# Patient Record
Sex: MALE | Race: WHITE | HISPANIC OR LATINO | URBAN - METROPOLITAN AREA
[De-identification: names, ages, dates, MRNs, and addresses within clinical notes are randomized per-mention and may not be internally consistent; named-entity substitution may affect disease eponyms.]

---

## 2023-01-04 ENCOUNTER — OUTPATIENT (OUTPATIENT)
Dept: OUTPATIENT SERVICES | Facility: HOSPITAL | Age: 23
LOS: 1 days | End: 2023-01-04
Payer: MEDICAID

## 2023-01-04 VITALS
OXYGEN SATURATION: 99 % | RESPIRATION RATE: 18 BRPM | DIASTOLIC BLOOD PRESSURE: 82 MMHG | HEIGHT: 71 IN | TEMPERATURE: 98 F | HEART RATE: 97 BPM | WEIGHT: 250 LBS | SYSTOLIC BLOOD PRESSURE: 130 MMHG

## 2023-01-04 DIAGNOSIS — N13.39 OTHER HYDRONEPHROSIS: ICD-10-CM

## 2023-01-04 DIAGNOSIS — Z01.818 ENCOUNTER FOR OTHER PREPROCEDURAL EXAMINATION: ICD-10-CM

## 2023-01-04 PROCEDURE — G0463: CPT

## 2023-01-04 NOTE — H&P PST ADULT - HISTORY OF PRESENT ILLNESS
22year old male with no significant pmhx presents with c/o hematuria related kidney calculus/ureteral obstruction. Patient is here today for presurgical testing for scheduled robotic assisted pyeloplasty with uteropelvic junction repair on 1/12/2023

## 2023-01-04 NOTE — H&P PST ADULT - PROBLEM SELECTOR PLAN 1
Patient is scheduled for robotic assisted pyeloplasty with uteropelvic junction repair on 1/12/2023  Written and oral preoperative instructions given to patient with understanding verbalized.   Instructions given to include using 4% chlorhexidine wash as directed starting 3days before day of surgery (inclusive of day of surgery)  Maintaining NPO status post midnight day before surgery  Stopping aspirin, NSAIDs, herbs, vitamins 7days before surgery   Patient is to expect a phone call day before surgery between the hours of 430- 630pm giving arrival time for surgery day.    Reinforced compliance with mandatory preop covid test.  Agrees to comply   Patient today with STOP bang score of 2, Low risk for MUMTAZ   Preoperative labs done at Dr. Shine's office, results to be obtained

## 2023-01-11 ENCOUNTER — TRANSCRIPTION ENCOUNTER (OUTPATIENT)
Age: 23
End: 2023-01-11

## 2023-01-11 RX ORDER — SODIUM CHLORIDE 9 MG/ML
3 INJECTION INTRAMUSCULAR; INTRAVENOUS; SUBCUTANEOUS EVERY 8 HOURS
Refills: 0 | Status: DISCONTINUED | OUTPATIENT
Start: 2023-01-12 | End: 2023-01-15

## 2023-01-12 ENCOUNTER — INPATIENT (INPATIENT)
Facility: HOSPITAL | Age: 23
LOS: 2 days | Discharge: HOME | DRG: 660 | End: 2023-01-15
Attending: UROLOGY | Admitting: UROLOGY
Payer: MEDICAID

## 2023-01-12 VITALS
TEMPERATURE: 99 F | RESPIRATION RATE: 18 BRPM | DIASTOLIC BLOOD PRESSURE: 75 MMHG | WEIGHT: 250 LBS | HEIGHT: 71 IN | OXYGEN SATURATION: 100 % | HEART RATE: 87 BPM | SYSTOLIC BLOOD PRESSURE: 132 MMHG

## 2023-01-12 DIAGNOSIS — N13.39 OTHER HYDRONEPHROSIS: ICD-10-CM

## 2023-01-12 DIAGNOSIS — Z01.818 ENCOUNTER FOR OTHER PREPROCEDURAL EXAMINATION: ICD-10-CM

## 2023-01-12 PROBLEM — N20.0 CALCULUS OF KIDNEY: Chronic | Status: ACTIVE | Noted: 2023-01-04

## 2023-01-12 PROBLEM — Z87.448 PERSONAL HISTORY OF OTHER DISEASES OF URINARY SYSTEM: Chronic | Status: ACTIVE | Noted: 2023-01-04

## 2023-01-12 PROBLEM — E66.9 OBESITY, UNSPECIFIED: Chronic | Status: ACTIVE | Noted: 2023-01-04

## 2023-01-12 LAB
ABO RH CONFIRMATION: SIGNIFICANT CHANGE UP
BLD GP AB SCN SERPL QL: SIGNIFICANT CHANGE UP

## 2023-01-12 DEVICE — URETERAL STENT PERCUFLEX PLUS 6FR 26CM: Type: IMPLANTABLE DEVICE | Status: FUNCTIONAL

## 2023-01-12 DEVICE — URETERAL CATH OPEN END FLEXI-TIP 5FR .038" X 70CM: Type: IMPLANTABLE DEVICE | Status: FUNCTIONAL

## 2023-01-12 DEVICE — STONE BASKET ESCAPE NITINOL 4-WIRE 1.9FR X 120CM: Type: IMPLANTABLE DEVICE | Status: FUNCTIONAL

## 2023-01-12 DEVICE — GUIDEWIRE SENSOR DUAL-FLEX NITINOL STRAIGHT .038" X 150CM: Type: IMPLANTABLE DEVICE | Status: FUNCTIONAL

## 2023-01-12 RX ORDER — FENTANYL CITRATE 50 UG/ML
25 INJECTION INTRAVENOUS
Refills: 0 | Status: DISCONTINUED | OUTPATIENT
Start: 2023-01-12 | End: 2023-01-13

## 2023-01-12 RX ORDER — SENNA PLUS 8.6 MG/1
2 TABLET ORAL AT BEDTIME
Refills: 0 | Status: DISCONTINUED | OUTPATIENT
Start: 2023-01-12 | End: 2023-01-15

## 2023-01-12 RX ORDER — OXYCODONE HYDROCHLORIDE 5 MG/1
5 TABLET ORAL EVERY 6 HOURS
Refills: 0 | Status: DISCONTINUED | OUTPATIENT
Start: 2023-01-12 | End: 2023-01-13

## 2023-01-12 RX ORDER — HEPARIN SODIUM 5000 [USP'U]/ML
5000 INJECTION INTRAVENOUS; SUBCUTANEOUS EVERY 8 HOURS
Refills: 0 | Status: DISCONTINUED | OUTPATIENT
Start: 2023-01-12 | End: 2023-01-13

## 2023-01-12 RX ORDER — CEFAZOLIN SODIUM 1 G
1000 VIAL (EA) INJECTION EVERY 6 HOURS
Refills: 0 | Status: COMPLETED | OUTPATIENT
Start: 2023-01-12 | End: 2023-01-13

## 2023-01-12 RX ORDER — FENTANYL CITRATE 50 UG/ML
50 INJECTION INTRAVENOUS
Refills: 0 | Status: DISCONTINUED | OUTPATIENT
Start: 2023-01-12 | End: 2023-01-13

## 2023-01-12 RX ORDER — SODIUM CHLORIDE 9 MG/ML
1000 INJECTION, SOLUTION INTRAVENOUS
Refills: 0 | Status: DISCONTINUED | OUTPATIENT
Start: 2023-01-12 | End: 2023-01-13

## 2023-01-12 RX ORDER — ACETAMINOPHEN 500 MG
975 TABLET ORAL EVERY 6 HOURS
Refills: 0 | Status: DISCONTINUED | OUTPATIENT
Start: 2023-01-12 | End: 2023-01-13

## 2023-01-12 RX ORDER — MELOXICAM 15 MG/1
1 TABLET ORAL
Qty: 0 | Refills: 0 | DISCHARGE

## 2023-01-12 RX ORDER — ACETAMINOPHEN 500 MG
1000 TABLET ORAL ONCE
Refills: 0 | Status: COMPLETED | OUTPATIENT
Start: 2023-01-12 | End: 2023-01-13

## 2023-01-12 RX ORDER — OXYCODONE HYDROCHLORIDE 5 MG/1
10 TABLET ORAL EVERY 6 HOURS
Refills: 0 | Status: DISCONTINUED | OUTPATIENT
Start: 2023-01-12 | End: 2023-01-13

## 2023-01-12 RX ORDER — ONDANSETRON 8 MG/1
4 TABLET, FILM COATED ORAL ONCE
Refills: 0 | Status: DISCONTINUED | OUTPATIENT
Start: 2023-01-12 | End: 2023-01-13

## 2023-01-12 RX ADMIN — HEPARIN SODIUM 5000 UNIT(S): 5000 INJECTION INTRAVENOUS; SUBCUTANEOUS at 22:46

## 2023-01-12 RX ADMIN — SODIUM CHLORIDE 3 MILLILITER(S): 9 INJECTION INTRAMUSCULAR; INTRAVENOUS; SUBCUTANEOUS at 22:47

## 2023-01-12 RX ADMIN — SODIUM CHLORIDE 75 MILLILITER(S): 9 INJECTION, SOLUTION INTRAVENOUS at 22:47

## 2023-01-12 NOTE — BRIEF OPERATIVE NOTE - OPERATION/FINDINGS
Left ureteral stent placement followed by left robotic assisted pyeloplasty. 16Fr moore and CHELA drain left in place. EBL minimal.

## 2023-01-13 ENCOUNTER — TRANSCRIPTION ENCOUNTER (OUTPATIENT)
Age: 23
End: 2023-01-13

## 2023-01-13 LAB
ANION GAP SERPL CALC-SCNC: 10 MMOL/L — SIGNIFICANT CHANGE UP (ref 5–17)
ANION GAP SERPL CALC-SCNC: 11 MMOL/L — SIGNIFICANT CHANGE UP (ref 5–17)
BASOPHILS # BLD AUTO: 0.02 K/UL — SIGNIFICANT CHANGE UP (ref 0–0.2)
BASOPHILS NFR BLD AUTO: 0.1 % — SIGNIFICANT CHANGE UP (ref 0–2)
BUN SERPL-MCNC: 19 MG/DL — HIGH (ref 7–18)
BUN SERPL-MCNC: 22 MG/DL — HIGH (ref 7–18)
CALCIUM SERPL-MCNC: 8.6 MG/DL — SIGNIFICANT CHANGE UP (ref 8.4–10.5)
CALCIUM SERPL-MCNC: 8.7 MG/DL — SIGNIFICANT CHANGE UP (ref 8.4–10.5)
CHLORIDE SERPL-SCNC: 102 MMOL/L — SIGNIFICANT CHANGE UP (ref 96–108)
CHLORIDE SERPL-SCNC: 102 MMOL/L — SIGNIFICANT CHANGE UP (ref 96–108)
CO2 SERPL-SCNC: 22 MMOL/L — SIGNIFICANT CHANGE UP (ref 22–31)
CO2 SERPL-SCNC: 24 MMOL/L — SIGNIFICANT CHANGE UP (ref 22–31)
CREAT SERPL-MCNC: 1.1 MG/DL — SIGNIFICANT CHANGE UP (ref 0.5–1.3)
CREAT SERPL-MCNC: 1.23 MG/DL — SIGNIFICANT CHANGE UP (ref 0.5–1.3)
EGFR: 86 ML/MIN/1.73M2 — SIGNIFICANT CHANGE UP
EGFR: 98 ML/MIN/1.73M2 — SIGNIFICANT CHANGE UP
EOSINOPHIL # BLD AUTO: 0 K/UL — SIGNIFICANT CHANGE UP (ref 0–0.5)
EOSINOPHIL NFR BLD AUTO: 0 % — SIGNIFICANT CHANGE UP (ref 0–6)
GLUCOSE SERPL-MCNC: 138 MG/DL — HIGH (ref 70–99)
GLUCOSE SERPL-MCNC: 155 MG/DL — HIGH (ref 70–99)
HCT VFR BLD CALC: 30.9 % — LOW (ref 39–50)
HCT VFR BLD CALC: 33.9 % — LOW (ref 39–50)
HGB BLD-MCNC: 10.5 G/DL — LOW (ref 13–17)
HGB BLD-MCNC: 11.3 G/DL — LOW (ref 13–17)
IMM GRANULOCYTES NFR BLD AUTO: 0.7 % — SIGNIFICANT CHANGE UP (ref 0–0.9)
LYMPHOCYTES # BLD AUTO: 1.97 K/UL — SIGNIFICANT CHANGE UP (ref 1–3.3)
LYMPHOCYTES # BLD AUTO: 11.5 % — LOW (ref 13–44)
MCHC RBC-ENTMCNC: 27.4 PG — SIGNIFICANT CHANGE UP (ref 27–34)
MCHC RBC-ENTMCNC: 28 PG — SIGNIFICANT CHANGE UP (ref 27–34)
MCHC RBC-ENTMCNC: 33.3 GM/DL — SIGNIFICANT CHANGE UP (ref 32–36)
MCHC RBC-ENTMCNC: 34 GM/DL — SIGNIFICANT CHANGE UP (ref 32–36)
MCV RBC AUTO: 82.1 FL — SIGNIFICANT CHANGE UP (ref 80–100)
MCV RBC AUTO: 82.4 FL — SIGNIFICANT CHANGE UP (ref 80–100)
MONOCYTES # BLD AUTO: 1.25 K/UL — HIGH (ref 0–0.9)
MONOCYTES NFR BLD AUTO: 7.3 % — SIGNIFICANT CHANGE UP (ref 2–14)
NEUTROPHILS # BLD AUTO: 13.7 K/UL — HIGH (ref 1.8–7.4)
NEUTROPHILS NFR BLD AUTO: 80.4 % — HIGH (ref 43–77)
NRBC # BLD: 0 /100 WBCS — SIGNIFICANT CHANGE UP (ref 0–0)
NRBC # BLD: 0 /100 WBCS — SIGNIFICANT CHANGE UP (ref 0–0)
PLATELET # BLD AUTO: 230 K/UL — SIGNIFICANT CHANGE UP (ref 150–400)
PLATELET # BLD AUTO: 270 K/UL — SIGNIFICANT CHANGE UP (ref 150–400)
POTASSIUM SERPL-MCNC: 4.2 MMOL/L — SIGNIFICANT CHANGE UP (ref 3.5–5.3)
POTASSIUM SERPL-MCNC: 4.7 MMOL/L — SIGNIFICANT CHANGE UP (ref 3.5–5.3)
POTASSIUM SERPL-SCNC: 4.2 MMOL/L — SIGNIFICANT CHANGE UP (ref 3.5–5.3)
POTASSIUM SERPL-SCNC: 4.7 MMOL/L — SIGNIFICANT CHANGE UP (ref 3.5–5.3)
RBC # BLD: 3.75 M/UL — LOW (ref 4.2–5.8)
RBC # BLD: 4.13 M/UL — LOW (ref 4.2–5.8)
RBC # FLD: 13.8 % — SIGNIFICANT CHANGE UP (ref 10.3–14.5)
RBC # FLD: 13.9 % — SIGNIFICANT CHANGE UP (ref 10.3–14.5)
SARS-COV-2 RNA SPEC QL NAA+PROBE: SIGNIFICANT CHANGE UP
SODIUM SERPL-SCNC: 135 MMOL/L — SIGNIFICANT CHANGE UP (ref 135–145)
SODIUM SERPL-SCNC: 136 MMOL/L — SIGNIFICANT CHANGE UP (ref 135–145)
WBC # BLD: 16.64 K/UL — HIGH (ref 3.8–10.5)
WBC # BLD: 17.06 K/UL — HIGH (ref 3.8–10.5)
WBC # FLD AUTO: 16.64 K/UL — HIGH (ref 3.8–10.5)
WBC # FLD AUTO: 17.06 K/UL — HIGH (ref 3.8–10.5)

## 2023-01-13 RX ORDER — DOCUSATE SODIUM 100 MG
1 CAPSULE ORAL
Qty: 14 | Refills: 0
Start: 2023-01-13 | End: 2023-01-19

## 2023-01-13 RX ORDER — INFLUENZA VIRUS VACCINE 15; 15; 15; 15 UG/.5ML; UG/.5ML; UG/.5ML; UG/.5ML
0.5 SUSPENSION INTRAMUSCULAR ONCE
Refills: 0 | Status: DISCONTINUED | OUTPATIENT
Start: 2023-01-13 | End: 2023-01-15

## 2023-01-13 RX ORDER — ACETAMINOPHEN 500 MG
1000 TABLET ORAL EVERY 6 HOURS
Refills: 0 | Status: COMPLETED | OUTPATIENT
Start: 2023-01-13 | End: 2023-01-14

## 2023-01-13 RX ORDER — SODIUM CHLORIDE 9 MG/ML
1000 INJECTION, SOLUTION INTRAVENOUS
Refills: 0 | Status: DISCONTINUED | OUTPATIENT
Start: 2023-01-13 | End: 2023-01-14

## 2023-01-13 RX ORDER — OXYCODONE HYDROCHLORIDE 5 MG/1
1 TABLET ORAL
Qty: 16 | Refills: 0
Start: 2023-01-13 | End: 2023-01-16

## 2023-01-13 RX ADMIN — Medication 1000 MILLIGRAM(S): at 20:51

## 2023-01-13 RX ADMIN — HEPARIN SODIUM 5000 UNIT(S): 5000 INJECTION INTRAVENOUS; SUBCUTANEOUS at 06:04

## 2023-01-13 RX ADMIN — Medication 975 MILLIGRAM(S): at 06:03

## 2023-01-13 RX ADMIN — Medication 1000 MILLIGRAM(S): at 01:24

## 2023-01-13 RX ADMIN — Medication 100 MILLIGRAM(S): at 06:04

## 2023-01-13 RX ADMIN — Medication 975 MILLIGRAM(S): at 13:15

## 2023-01-13 RX ADMIN — OXYCODONE HYDROCHLORIDE 5 MILLIGRAM(S): 5 TABLET ORAL at 06:03

## 2023-01-13 RX ADMIN — SENNA PLUS 2 TABLET(S): 8.6 TABLET ORAL at 21:59

## 2023-01-13 RX ADMIN — SODIUM CHLORIDE 3 MILLILITER(S): 9 INJECTION INTRAMUSCULAR; INTRAVENOUS; SUBCUTANEOUS at 06:15

## 2023-01-13 RX ADMIN — Medication 975 MILLIGRAM(S): at 12:39

## 2023-01-13 RX ADMIN — OXYCODONE HYDROCHLORIDE 10 MILLIGRAM(S): 5 TABLET ORAL at 02:09

## 2023-01-13 RX ADMIN — SODIUM CHLORIDE 125 MILLILITER(S): 9 INJECTION, SOLUTION INTRAVENOUS at 14:05

## 2023-01-13 RX ADMIN — SODIUM CHLORIDE 75 MILLILITER(S): 9 INJECTION, SOLUTION INTRAVENOUS at 12:37

## 2023-01-13 RX ADMIN — Medication 400 MILLIGRAM(S): at 00:54

## 2023-01-13 RX ADMIN — Medication 100 MILLIGRAM(S): at 00:54

## 2023-01-13 RX ADMIN — SODIUM CHLORIDE 3 MILLILITER(S): 9 INJECTION INTRAMUSCULAR; INTRAVENOUS; SUBCUTANEOUS at 14:03

## 2023-01-13 RX ADMIN — Medication 400 MILLIGRAM(S): at 20:25

## 2023-01-13 RX ADMIN — SODIUM CHLORIDE 3 MILLILITER(S): 9 INJECTION INTRAMUSCULAR; INTRAVENOUS; SUBCUTANEOUS at 21:11

## 2023-01-13 NOTE — CHART NOTE - NSCHARTNOTEFT_GEN_A_CORE
23 y/o Male s/p left pyeloplasty 1/12/23    Called by nursing staff, increased CHELA drain output, draining 400cc for the past 300cc.   Pt has not voided since Kelly catheter removed this AM.     Pt seen and examined at bedside. Admits to incisional pain, denies dizziness, lightheadedness. No SOB.      Vital Signs Last 24 Hrs  T(C): 36.8 (13 Jan 2023 12:44), Max: 36.8 (12 Jan 2023 21:10)  T(F): 98.3 (13 Jan 2023 12:44), Max: 98.3 (13 Jan 2023 12:44)  HR: 106 (13 Jan 2023 12:44) (102 - 118)  BP: 133/53 (13 Jan 2023 12:44) (103/48 - 152/78)  BP(mean): 91 (12 Jan 2023 21:25) (87 - 98)  RR: 18 (13 Jan 2023 12:44) (11 - 23)  SpO2: 98% (13 Jan 2023 12:44) (93% - 100%)      Physical Exam  General: AAOx3, No acute distress  Abdomen: soft, nondistended, min incisional TTP, dressing c/d/i, no rebound tenderness, no guarding, no palpable masses, CHELA in place output sanguinous   : Normal external genitalia  Extremities: non edematous, no calf pain bilaterally        Labs:                        10.5   16.64 )-----------( 230      ( 13 Jan 2023 13:20 )             30.9     01-13    136  |  102  |  22<H>  ----------------------------<  155<H>  4.2   |  24  |  x     Ca    8.7      13 Jan 2023 13:20        A/P:    23 y/o Male s/p left pyeloplasty 1/12/23  Urinary retention, increased CHELA drain output    -STAT cbc   -Kelly catheter   -NPO  -IVF at 125cc/hr   -Bedrest  -Discontinue anticoagulation  -D/w Dr. Shine and aware 21 y/o Male s/p left pyeloplasty 1/12/23    Called by nursing staff, increased CHELA drain output, draining 400cc for the past 300cc.   Pt has not voided since Kelly catheter removed this AM.     Pt seen and examined at bedside. Admits to incisional pain, denies dizziness, lightheadedness. No SOB.      Vital Signs Last 24 Hrs  T(C): 36.8 (13 Jan 2023 12:44), Max: 36.8 (12 Jan 2023 21:10)  T(F): 98.3 (13 Jan 2023 12:44), Max: 98.3 (13 Jan 2023 12:44)  HR: 106 (13 Jan 2023 12:44) (102 - 118)  BP: 133/53 (13 Jan 2023 12:44) (103/48 - 152/78)  BP(mean): 91 (12 Jan 2023 21:25) (87 - 98)  RR: 18 (13 Jan 2023 12:44) (11 - 23)  SpO2: 98% (13 Jan 2023 12:44) (93% - 100%)      Physical Exam  General: AAOx3, No acute distress  Abdomen: soft, nondistended, min incisional TTP, dressing c/d/i, no rebound tenderness, no guarding, no palpable masses, CHELA in place output sanguinous   : Normal external genitalia  Extremities: non edematous, no calf pain bilaterally        Labs:                        10.5   16.64 )-----------( 230      ( 13 Jan 2023 13:20 )             30.9     01-13    136  |  102  |  22<H>  ----------------------------<  155<H>  4.2   |  24  |  x     Ca    8.7      13 Jan 2023 13:20        A/P:    21 y/o Male s/p left pyeloplasty 1/12/23  Urinary retention, increased CHELA drain output    -STAT cbc  -Monitor CHELA drain output   -Kelly catheter   -NPO  -IVF at 125cc/hr   -Pain medication PRN   -Bedrest  -Discontinue anticoagulation  -D/w Dr. Shine and aware

## 2023-01-13 NOTE — DISCHARGE NOTE PROVIDER - HOSPITAL COURSE
22year old male with no significant pmhx presents with c/o hematuria related left kidney calculus/ureteral obstruction. Has 3 stones as well in the upper and mid poles of the left kidney. Imaging showed moderate hydronephrosis at the level of the right UPJ. Patient is now POD1 s/p robotic left pyeloplasty with ureteral stent insertion. This morning he was doing well. Vitals stable overnight and moore removed, awaiting passing trial of void. No nausea or vomiting. Has not passed flatus yet but diet was advanced to regular for lunch. Plan to take CHELA out prior to discharge and discharge home today once he passes his TOV. Ureteral stent will stay in place and be removed in the office in about 6 weeks. Stones were unable to be removed during case but based on urine pH and the fact that stones were very yellow in color, most likely uric acid stones.

## 2023-01-13 NOTE — DISCHARGE NOTE PROVIDER - NSDCCPCAREPLAN_GEN_ALL_CORE_FT
PRINCIPAL DISCHARGE DIAGNOSIS  Diagnosis: Calculus of kidney  Assessment and Plan of Treatment: Community Status: Active

## 2023-01-13 NOTE — DISCHARGE NOTE PROVIDER - NSDCMRMEDTOKEN_GEN_ALL_CORE_FT
Colace 100 mg oral capsule: 1 cap(s) orally 2 times a day   meloxicam 10 mg oral capsule: 1 cap(s) orally once a day  oxyCODONE 5 mg oral tablet: 1 tab(s) orally every 6 hours, As Needed -for severe pain MDD:FOUR

## 2023-01-13 NOTE — DISCHARGE NOTE PROVIDER - CARE PROVIDER_API CALL
Carlos Shine)  Urology  [address] [place] [address]  Phone: [phone]  Fax: [phone]  Established Patient  Follow Up Time: 2 weeks

## 2023-01-13 NOTE — DISCHARGE NOTE PROVIDER - NSDCFUADDINST_GEN_ALL_CORE_FT
-After your surgery, it is common to have some blood in the urine.  The urine may appear pink or even red.  If you start to notice thick blood or many blood clots in the urine, call your physician.  Otherwise, drink a lot of fluids in order to dilute the urine well.  If you are unable to urinate or you feel abdominal fullness, sitting in a tub of warm water (sitz bath) may help; if not, call your physician.    -Call the office if you have fever greater than 101, no urine in bag, pain not relieved with pain medication, nausea/vomiting.    -You may experience weakness after you go home; this is normal.  You will slowly regain your strength, during which time you may gradually increase your level of activity.  Do not exercise, lift heavy objects, drive, or resume sexual activity until you are told to do so by your physician.  You may walk around and even climb stairs, carefully.  Do not allow yourself to become constipated, as straining may cause bleeding.  Take stool softeners (ex. Colace) or a laxative (ex. Senekot, ExLax) if needed.  Pain medications may worsen constipation.  If you are using these medications, try to use only that which you need for pain control.  If Extra Strength Tylenol is adequate to control your pain, this is an excellent choice, as this does not cause constipation.    -Your incision may be covered with steristrips. You may shower, just pat white strips dry, they will fall off in a few weeks. Change dressing at drain site daily or as needed until dry.  If your incisions “oozes” a small amount of clear liquid, do not be alarmed.  If this drainage increases significantly or becomes thick, and the area around the incision looks very red, call your physician (a small amount of redness just around the incision is normal).    -After you arrive at home, call your Urologist’s office to arrange a follow-up appointment.    -A few days of pain medication sent to your pharmacy     -Please follow up with Dr. Shine in 1-2 weeks and call to make an appointment     -Potassium citrate also sent to pharmacy to help with kidney stones

## 2023-01-13 NOTE — DISCHARGE NOTE PROVIDER - NSTOBACCOUSAGEY/N_GEN_A_CS
FUTURE VISIT INFORMATION      FUTURE VISIT INFORMATION:    Date: 3/23/2021    Time: 4pm    Location: [facility]  REFERRAL INFORMATION:    Referring provider:      Referring providers clinic:      Reason for visit/diagnosis      RECORDS REQUESTED FROM:       Clinic name Comments Records Status Imaging Status   [facility] MR Lumbar Spine -12/4/2020, 9/15/2020 Care Everywhere Requested to PACS                                    3/19/2021-Spoke with [facility] Radiology to have images pushed ASA-MR @ 258pm    3/23/2021-[facility] Images now in PACS-MR @ 659am   No

## 2023-01-13 NOTE — PATIENT PROFILE ADULT - FALL HARM RISK - HARM RISK INTERVENTIONS
Assistance with ambulation/Assistance OOB with selected safe patient handling equipment/Communicate Risk of Fall with Harm to all staff/Discuss with provider need for PT consult/Monitor gait and stability/Reinforce activity limits and safety measures with patient and family/Sit up slowly, dangle for a short time, stand at bedside before walking/Tailored Fall Risk Interventions/Use of alarms - bed, chair and/or voice tab/Visual Cue: Yellow wristband and red socks/Bed in lowest position, wheels locked, appropriate side rails in place/Call bell, personal items and telephone in reach/Instruct patient to call for assistance before getting out of bed or chair/Non-slip footwear when patient is out of bed/[place] to call system/Physically safe environment - no spills, clutter or unnecessary equipment/Purposeful Proactive Rounding/Room/bathroom lighting operational, light cord in reach

## 2023-01-14 LAB
ANION GAP SERPL CALC-SCNC: 9 MMOL/L — SIGNIFICANT CHANGE UP (ref 5–17)
BUN SERPL-MCNC: 14 MG/DL — SIGNIFICANT CHANGE UP (ref 7–18)
CALCIUM SERPL-MCNC: 7.9 MG/DL — LOW (ref 8.4–10.5)
CHLORIDE SERPL-SCNC: 106 MMOL/L — SIGNIFICANT CHANGE UP (ref 96–108)
CO2 SERPL-SCNC: 25 MMOL/L — SIGNIFICANT CHANGE UP (ref 22–31)
CREAT FLD-MCNC: 0.82 MG/DL — SIGNIFICANT CHANGE UP
CREAT SERPL-MCNC: 1 MG/DL — SIGNIFICANT CHANGE UP (ref 0.5–1.3)
EGFR: 110 ML/MIN/1.73M2 — SIGNIFICANT CHANGE UP
GLUCOSE SERPL-MCNC: 128 MG/DL — HIGH (ref 70–99)
HCT VFR BLD CALC: 24.4 % — LOW (ref 39–50)
HCT VFR BLD CALC: 24.8 % — LOW (ref 39–50)
HCT VFR BLD CALC: 25.5 % — LOW (ref 39–50)
HGB BLD-MCNC: 8.1 G/DL — LOW (ref 13–17)
HGB BLD-MCNC: 8.1 G/DL — LOW (ref 13–17)
HGB BLD-MCNC: 8.4 G/DL — LOW (ref 13–17)
MCHC RBC-ENTMCNC: 27.4 PG — SIGNIFICANT CHANGE UP (ref 27–34)
MCHC RBC-ENTMCNC: 27.6 PG — SIGNIFICANT CHANGE UP (ref 27–34)
MCHC RBC-ENTMCNC: 27.8 PG — SIGNIFICANT CHANGE UP (ref 27–34)
MCHC RBC-ENTMCNC: 32.7 GM/DL — SIGNIFICANT CHANGE UP (ref 32–36)
MCHC RBC-ENTMCNC: 32.9 GM/DL — SIGNIFICANT CHANGE UP (ref 32–36)
MCHC RBC-ENTMCNC: 33.2 GM/DL — SIGNIFICANT CHANGE UP (ref 32–36)
MCV RBC AUTO: 83.8 FL — SIGNIFICANT CHANGE UP (ref 80–100)
MCV RBC AUTO: 83.8 FL — SIGNIFICANT CHANGE UP (ref 80–100)
MCV RBC AUTO: 83.9 FL — SIGNIFICANT CHANGE UP (ref 80–100)
NRBC # BLD: 0 /100 WBCS — SIGNIFICANT CHANGE UP (ref 0–0)
PLATELET # BLD AUTO: 178 K/UL — SIGNIFICANT CHANGE UP (ref 150–400)
PLATELET # BLD AUTO: 178 K/UL — SIGNIFICANT CHANGE UP (ref 150–400)
PLATELET # BLD AUTO: 190 K/UL — SIGNIFICANT CHANGE UP (ref 150–400)
POTASSIUM SERPL-MCNC: 3.9 MMOL/L — SIGNIFICANT CHANGE UP (ref 3.5–5.3)
POTASSIUM SERPL-SCNC: 3.9 MMOL/L — SIGNIFICANT CHANGE UP (ref 3.5–5.3)
RBC # BLD: 2.91 M/UL — LOW (ref 4.2–5.8)
RBC # BLD: 2.96 M/UL — LOW (ref 4.2–5.8)
RBC # BLD: 3.04 M/UL — LOW (ref 4.2–5.8)
RBC # FLD: 13.9 % — SIGNIFICANT CHANGE UP (ref 10.3–14.5)
RBC # FLD: 13.9 % — SIGNIFICANT CHANGE UP (ref 10.3–14.5)
RBC # FLD: 14 % — SIGNIFICANT CHANGE UP (ref 10.3–14.5)
SODIUM SERPL-SCNC: 140 MMOL/L — SIGNIFICANT CHANGE UP (ref 135–145)
WBC # BLD: 11.25 K/UL — HIGH (ref 3.8–10.5)
WBC # BLD: 9.85 K/UL — SIGNIFICANT CHANGE UP (ref 3.8–10.5)
WBC # BLD: 9.88 K/UL — SIGNIFICANT CHANGE UP (ref 3.8–10.5)
WBC # FLD AUTO: 11.25 K/UL — HIGH (ref 3.8–10.5)
WBC # FLD AUTO: 9.85 K/UL — SIGNIFICANT CHANGE UP (ref 3.8–10.5)
WBC # FLD AUTO: 9.88 K/UL — SIGNIFICANT CHANGE UP (ref 3.8–10.5)

## 2023-01-14 RX ADMIN — SODIUM CHLORIDE 3 MILLILITER(S): 9 INJECTION INTRAMUSCULAR; INTRAVENOUS; SUBCUTANEOUS at 13:03

## 2023-01-14 RX ADMIN — SODIUM CHLORIDE 125 MILLILITER(S): 9 INJECTION, SOLUTION INTRAVENOUS at 05:03

## 2023-01-14 RX ADMIN — Medication 400 MILLIGRAM(S): at 05:03

## 2023-01-14 RX ADMIN — Medication 1000 MILLIGRAM(S): at 12:45

## 2023-01-14 RX ADMIN — SODIUM CHLORIDE 3 MILLILITER(S): 9 INJECTION INTRAMUSCULAR; INTRAVENOUS; SUBCUTANEOUS at 22:49

## 2023-01-14 RX ADMIN — SENNA PLUS 2 TABLET(S): 8.6 TABLET ORAL at 22:33

## 2023-01-14 RX ADMIN — Medication 400 MILLIGRAM(S): at 01:02

## 2023-01-14 RX ADMIN — Medication 1000 MILLIGRAM(S): at 01:12

## 2023-01-14 RX ADMIN — SODIUM CHLORIDE 3 MILLILITER(S): 9 INJECTION INTRAMUSCULAR; INTRAVENOUS; SUBCUTANEOUS at 05:30

## 2023-01-14 RX ADMIN — Medication 1000 MILLIGRAM(S): at 05:30

## 2023-01-14 RX ADMIN — Medication 400 MILLIGRAM(S): at 12:31

## 2023-01-14 RX ADMIN — Medication 975 MILLIGRAM(S): at 06:00

## 2023-01-15 ENCOUNTER — TRANSCRIPTION ENCOUNTER (OUTPATIENT)
Age: 23
End: 2023-01-15

## 2023-01-15 VITALS
TEMPERATURE: 100 F | DIASTOLIC BLOOD PRESSURE: 72 MMHG | SYSTOLIC BLOOD PRESSURE: 118 MMHG | HEART RATE: 102 BPM | RESPIRATION RATE: 18 BRPM | OXYGEN SATURATION: 98 %

## 2023-01-15 LAB
ANION GAP SERPL CALC-SCNC: 5 MMOL/L — SIGNIFICANT CHANGE UP (ref 5–17)
BUN SERPL-MCNC: 8 MG/DL — SIGNIFICANT CHANGE UP (ref 7–18)
CALCIUM SERPL-MCNC: 8.7 MG/DL — SIGNIFICANT CHANGE UP (ref 8.4–10.5)
CHLORIDE SERPL-SCNC: 106 MMOL/L — SIGNIFICANT CHANGE UP (ref 96–108)
CO2 SERPL-SCNC: 28 MMOL/L — SIGNIFICANT CHANGE UP (ref 22–31)
CREAT SERPL-MCNC: 0.68 MG/DL — SIGNIFICANT CHANGE UP (ref 0.5–1.3)
EGFR: 136 ML/MIN/1.73M2 — SIGNIFICANT CHANGE UP
GLUCOSE SERPL-MCNC: 123 MG/DL — HIGH (ref 70–99)
HCT VFR BLD CALC: 23.8 % — LOW (ref 39–50)
HGB BLD-MCNC: 7.8 G/DL — LOW (ref 13–17)
MCHC RBC-ENTMCNC: 27.6 PG — SIGNIFICANT CHANGE UP (ref 27–34)
MCHC RBC-ENTMCNC: 32.8 GM/DL — SIGNIFICANT CHANGE UP (ref 32–36)
MCV RBC AUTO: 84.1 FL — SIGNIFICANT CHANGE UP (ref 80–100)
NRBC # BLD: 0 /100 WBCS — SIGNIFICANT CHANGE UP (ref 0–0)
PLATELET # BLD AUTO: 171 K/UL — SIGNIFICANT CHANGE UP (ref 150–400)
POTASSIUM SERPL-MCNC: 4.1 MMOL/L — SIGNIFICANT CHANGE UP (ref 3.5–5.3)
POTASSIUM SERPL-SCNC: 4.1 MMOL/L — SIGNIFICANT CHANGE UP (ref 3.5–5.3)
RBC # BLD: 2.83 M/UL — LOW (ref 4.2–5.8)
RBC # FLD: 14.1 % — SIGNIFICANT CHANGE UP (ref 10.3–14.5)
SODIUM SERPL-SCNC: 139 MMOL/L — SIGNIFICANT CHANGE UP (ref 135–145)
WBC # BLD: 9.99 K/UL — SIGNIFICANT CHANGE UP (ref 3.8–10.5)
WBC # FLD AUTO: 9.99 K/UL — SIGNIFICANT CHANGE UP (ref 3.8–10.5)

## 2023-01-15 PROCEDURE — 85025 COMPLETE CBC W/AUTO DIFF WBC: CPT

## 2023-01-15 PROCEDURE — 86850 RBC ANTIBODY SCREEN: CPT

## 2023-01-15 PROCEDURE — 80048 BASIC METABOLIC PNL TOTAL CA: CPT

## 2023-01-15 PROCEDURE — C1758: CPT

## 2023-01-15 PROCEDURE — 82570 ASSAY OF URINE CREATININE: CPT

## 2023-01-15 PROCEDURE — 76000 FLUOROSCOPY <1 HR PHYS/QHP: CPT

## 2023-01-15 PROCEDURE — 36415 COLL VENOUS BLD VENIPUNCTURE: CPT

## 2023-01-15 PROCEDURE — C9399: CPT

## 2023-01-15 PROCEDURE — 86900 BLOOD TYPING SEROLOGIC ABO: CPT

## 2023-01-15 PROCEDURE — C1889: CPT

## 2023-01-15 PROCEDURE — C1769: CPT

## 2023-01-15 PROCEDURE — C2617: CPT

## 2023-01-15 PROCEDURE — 86901 BLOOD TYPING SEROLOGIC RH(D): CPT

## 2023-01-15 PROCEDURE — 85027 COMPLETE CBC AUTOMATED: CPT

## 2023-01-15 PROCEDURE — 87635 SARS-COV-2 COVID-19 AMP PRB: CPT

## 2023-01-15 PROCEDURE — S2900: CPT

## 2023-01-15 RX ORDER — OXYCODONE HYDROCHLORIDE 5 MG/1
1 TABLET ORAL
Qty: 12 | Refills: 0
Start: 2023-01-15 | End: 2023-01-17

## 2023-01-15 RX ORDER — DOCUSATE SODIUM 100 MG
1 CAPSULE ORAL
Qty: 14 | Refills: 0
Start: 2023-01-15 | End: 2023-01-21

## 2023-01-15 RX ADMIN — SODIUM CHLORIDE 3 MILLILITER(S): 9 INJECTION INTRAMUSCULAR; INTRAVENOUS; SUBCUTANEOUS at 05:12

## 2023-01-15 NOTE — DISCHARGE NOTE NURSING/CASE MANAGEMENT/SOCIAL WORK - NSDCPEFALRISK_GEN_ALL_CORE
For information on Fall & Injury Prevention, visit: [email or web address] OR  [email or web address] OR  https://www.cdc.gov/steadi/patient.html

## 2023-01-15 NOTE — PROGRESS NOTE ADULT - SUBJECTIVE AND OBJECTIVE BOX
INTERVAL HPI/OVERNIGHT EVENTS:    Pt seen and examined at bedside. Admits to incisional pain, denies nausea or vomiting; No fever, chills, SOB or CP. On clear diet, tolerating well.       Vital Signs Last 24 Hrs  T(C): 36.7 (13 Jan 2023 05:43), Max: 37.1 (12 Jan 2023 13:44)  T(F): 98.1 (13 Jan 2023 05:43), Max: 98.8 (12 Jan 2023 13:44)  HR: 111 (13 Jan 2023 05:43) (87 - 118)  BP: 103/48 (13 Jan 2023 05:43) (103/48 - 152/78)  BP(mean): 91 (12 Jan 2023 21:25) (87 - 98)  RR: 18 (13 Jan 2023 05:43) (11 - 23)  SpO2: 99% (13 Jan 2023 05:43) (93% - 100%)    Parameters below as of 13 Jan 2023 05:43  Patient On (Oxygen Delivery Method): room air      I&O's Detail    12 Jan 2023 07:01  -  13 Jan 2023 07:00  --------------------------------------------------------  IN:  Total IN: 0 mL    OUT:    Blood Loss (mL): 20 mL    Bulb (mL): 140 mL    Indwelling Catheter - Urethral (mL): 1150 mL  Total OUT: 1310 mL    Total NET: -1310 mL    senna 2 Tablet(s) Oral at bedtime PRN      Physical Exam  General: AAOx3, No acute distress  Abdomen: soft, nondistended, incisional TTP, dressing c/d/i, CHELA in place, ss output, no rebound tenderness, no guarding, no palpable masses  Extremities: non edematous, no calf pain bilaterally        Labs:                        11.3   17.06 )-----------( 270      ( 13 Jan 2023 07:00 )             33.9     01-13    135  |  102  |  19<H>  ----------------------------<  138<H>  4.7   |  22  |  1.10    Ca    8.6      13 Jan 2023 07:00        
INTERVAL HPI/OVERNIGHT EVENTS:  Pt seen and examined at bedside.   Pt resting comfortably. Had weakness yesterday, reports feeling stronger today  Tolerating diet.   Denies syncope, lightheadedness, weakness, chest pain, shortness of breath  CHELA with high output of sanguinous fluid  Kelly catheter in place draining clear yellow urine     MEDICATIONS  (STANDING):  acetaminophen   IVPB .. 1000 milliGRAM(s) IV Intermittent every 6 hours  influenza   Vaccine 0.5 milliLiter(s) IntraMuscular once  sodium chloride 0.9% lock flush 3 milliLiter(s) IV Push every 8 hours    MEDICATIONS  (PRN):  senna 2 Tablet(s) Oral at bedtime PRN Constipation      Vital Signs Last 24 Hrs  T(C): 36.9 (14 Jan 2023 05:08), Max: 37.1 (13 Jan 2023 14:20)  T(F): 98.5 (14 Jan 2023 05:08), Max: 98.8 (13 Jan 2023 14:20)  HR: 99 (14 Jan 2023 05:08) (99 - 108)  BP: 122/73 (14 Jan 2023 05:08) (117/67 - 133/53)  BP(mean): --  RR: 18 (14 Jan 2023 05:08) (17 - 18)  SpO2: 97% (14 Jan 2023 05:08) (97% - 98%)    Parameters below as of 14 Jan 2023 05:08  Patient On (Oxygen Delivery Method): room air        Physical:  General: A&Ox3. NAD.  REspiratinos: Unlabored  Abdomen: soft, nondistended, incisional TTP, dressing c/d/i, CHELA in place sanguinous output in bulb, high output, no rebound, no peritonitis     I&O's Detail    13 Jan 2023 07:01  -  14 Jan 2023 07:00  --------------------------------------------------------  IN:  Total IN: 0 mL    OUT:    Bulb (mL): 1095 mL    Indwelling Catheter - Urethral (mL): 1900 mL  Total OUT: 2995 mL    Total NET: -2995 mL          LABS:                        8.4    9.85  )-----------( 178      ( 14 Jan 2023 06:20 )             25.5             01-14    140  |  106  |  14  ----------------------------<  128<H>  3.9   |  25  |  1.00    Ca    7.9<L>      14 Jan 2023 06:20        
INTERVAL HPI/OVERNIGHT EVENTS:  Pt seen and examined at bedside.   Pt resting comfortably. REports he feels really well, much stronger than yesterday  Denies dizziness/lightheadedness/syncope/chestpain/SOB  Tolerating diet.   CHELA sanguinous with decrease in output  Voiding well, moore removed yesterday    MEDICATIONS  (STANDING):  influenza   Vaccine 0.5 milliLiter(s) IntraMuscular once  sodium chloride 0.9% lock flush 3 milliLiter(s) IV Push every 8 hours    MEDICATIONS  (PRN):  oxycodone    5 mG/acetaminophen 325 mG 1 Tablet(s) Oral every 4 hours PRN Moderate Pain (4 - 6)  senna 2 Tablet(s) Oral at bedtime PRN Constipation      Vital Signs Last 24 Hrs  T(C): 36.9 (15 Oli 2023 04:11), Max: 37 (15 Oli 2023 00:03)  T(F): 98.5 (15 Oli 2023 04:11), Max: 98.6 (15 Oli 2023 00:03)  HR: 106 (15 Oli 2023 04:11) (97 - 106)  BP: 118/65 (15 Oli 2023 04:11) (112/53 - 128/72)  BP(mean): --  RR: 17 (15 Oli 2023 04:11) (16 - 17)  SpO2: 97% (15 Oli 2023 04:11) (97% - 98%)    Parameters below as of 15 Oli 2023 04:11  Patient On (Oxygen Delivery Method): room air        Physical:  General: A&Ox3. NAD.  Respirations: Unlabored   Abdomen: Soft nondistended, appropriate incisional tenderness, CHELA sanguinous, amount noted   : Normal external genitalia     I&O's Detail    14 Jan 2023 07:01  -  15 Oli 2023 07:00  --------------------------------------------------------  IN:  Total IN: 0 mL    OUT:    Bulb (mL): 135 mL    Indwelling Catheter - Urethral (mL): 1450 mL    Voided (mL): 450 mL  Total OUT: 2035 mL    Total NET: -2035 mL          LABS:                        7.8    9.99  )-----------( 171      ( 15 Oli 2023 05:10 )             23.8             01-15    139  |  106  |  8   ----------------------------<  123<H>  4.1   |  28  |  0.68    Ca    8.7      15 Oli 2023 05:10        
S/p left robotic pyeloplasty pod 1. Unable to void today after catheter removal, Kelly replalced 500 cc urine drained.   Pt feels bladder, passed flatus, barbara PO. 
POST-OPERATIVE NOTE    Subjective:   22y Male s/p lt ureteral stent placement , and robotic assisted pyeloplasty POD #0 Pain is well controlled. No nausea or any other complaints. .+moore    Vital Signs Last 24 Hrs  T(C): 36.8 (12 Jan 2023 21:55), Max: 37.1 (12 Jan 2023 13:44)  T(F): 98.2 (12 Jan 2023 21:55), Max: 98.8 (12 Jan 2023 13:44)  HR: 108 (12 Jan 2023 21:55) (87 - 118)  BP: 150/72 (12 Jan 2023 21:55) (132/75 - 152/78)  BP(mean): 91 (12 Jan 2023 21:25) (87 - 98)  RR: 18 (12 Jan 2023 21:55) (11 - 23)  SpO2: 98% (12 Jan 2023 21:55) (93% - 100%)    Parameters below as of 12 Jan 2023 21:55  Patient On (Oxygen Delivery Method): room air      I&O's Detail    12 Jan 2023 07:01  -  13 Jan 2023 04:47  --------------------------------------------------------  IN:  Total IN: 0 mL    OUT:    Blood Loss (mL): 20 mL    Bulb (mL): 20 mL    Indwelling Catheter - Urethral (mL): 650 mL  Total OUT: 690 mL    Total NET: -690 mL          Physical Exam:  General: NAD, somnolent  EYES: EOMI, PERRLA, conjunctiva and sclera clear  Pulmonary: Nonlabored breathing, no respiratory distress  Cardiovascular: NSR, S1, S2  Abdominal: soft, NT/ND, dressing c/d/i  Extremities: no edema, no calf tenderness, distal pulses are palpable     LABS:              MEDICATIONS  (STANDING):  acetaminophen     Tablet .. 975 milliGRAM(s) Oral every 6 hours  ceFAZolin   IVPB 1000 milliGRAM(s) IV Intermittent every 6 hours  heparin   Injectable 5000 Unit(s) SubCutaneous every 8 hours  lactated ringers. 1000 milliLiter(s) (75 mL/Hr) IV Continuous <Continuous>  lactated ringers. 1000 milliLiter(s) (75 mL/Hr) IV Continuous <Continuous>  sodium chloride 0.9% lock flush 3 milliLiter(s) IV Push every 8 hours    MEDICATIONS  (PRN):  fentaNYL    Injectable 25 MICROGram(s) IV Push every 5 minutes PRN Moderate Pain (4 - 6)  fentaNYL    Injectable 50 MICROGram(s) IV Push every 5 minutes PRN Severe Pain (7 - 10)  ondansetron Injectable 4 milliGRAM(s) IV Push once PRN Nausea and/or Vomiting  oxyCODONE    IR 5 milliGRAM(s) Oral every 6 hours PRN Moderate Pain (4 - 6)  oxyCODONE    IR 10 milliGRAM(s) Oral every 6 hours PRN Severe Pain (7 - 10)  senna 2 Tablet(s) Oral at bedtime PRN Constipation      Assessment:  The patient is a 22y Male who is s/p lt ureteral stent placement , and robotic assisted pyeloplasty POD #0 Stable    Plan:  - Start TOV in AM  -Resume diet, Adv diet as tolerated  - OOB and ambulating as tolerated  - F/u AM labs  - DVT ppx

## 2023-01-15 NOTE — PROGRESS NOTE ADULT - ASSESSMENT
23 y/o male S/p left robotic pyeloplasty 1/12. Postop bleed, drop in H/H noted, now stabilized, vital signs stable, some mild tachycardia, BP remains stable. CHELA output now 20cc in 8 hours remains sanguinous. CHELA Cr reviewed.    -Plan to remove CHELA drain    -Pain control   -OOB/IS  -Dc planning with outpatient follow up  -Discussed with Dr. Shine
Exam.   Comfortable.   Abd soft nt nd, randal decreasing outpt.   Moore clear urine.   Pt was able to get OOB with me.   Will keep moore for now, trend RANDAL outpt.   TOV tomorrow if RANDAL output remains low. 
21 y/o Male s/p left pyeloplasty 1/12/23    -Advance diet as tolerated   -Monitor CHELA drain output   -Pain medication PRN   -Stool softeners   -Kelly Catheter removed, TOV   -DVT ppx   -OOB/Ambulate   -Dc planning 
21 y/o male S/p left robotic pyeloplasty 1/12. Unable to void yesterday after catheter removal, Moore replalced draining clear yellow urine. Postop bleed, drop in H/H noted, high CHELA output sanguinous noted and discussed with attending. Tachycardic   -F/u CHELA Cr  -Keep CHELA off suction, monitor output   -Serial exams  -Serial CBCs  -Monitor vital signs  -Continue moore cather, monitor output  -Strict I's and O's   -Pain control   -OOB  -Discussed with Dr. Shine

## 2023-01-15 NOTE — DISCHARGE NOTE NURSING/CASE MANAGEMENT/SOCIAL WORK - PATIENT PORTAL LINK FT
You can access the FollowMyHealth Patient Portal offered by [facility] by registering at the following website: [email or web address]. By joining [facility]’s FollowMyHealth portal, you will also be able to view your health information using other applications (apps) compatible with our system.

## 2023-03-02 NOTE — H&P PST ADULT - PAIN SCORE
You had 3 sutures placed in the laceration to your left wrist.  Please keep this area clean and dry. Wear the wrist splint to protect the wound. You will need to have your sutures removed in 7 to 10 days. Although you will be on antibiotics please monitor the wound closely for signs of infection such as increasing redness, drainage, increasing swelling or pain, fevers or streaking from the wrist.  If you notice any of these symptoms please return to the emergency department. You are also provided with a referral for a primary care physician, please call to establish care. You can take Tylenol and ibuprofen as needed for pain. Please complete your entire course of antibiotics. 0

## 2023-03-09 PROBLEM — Z00.00 ENCOUNTER FOR PREVENTIVE HEALTH EXAMINATION: Status: ACTIVE | Noted: 2023-03-09

## 2023-03-10 ENCOUNTER — APPOINTMENT (OUTPATIENT)
Dept: UROLOGY | Facility: CLINIC | Age: 23
End: 2023-03-10
Payer: MEDICAID

## 2023-03-10 VITALS
RESPIRATION RATE: 18 BRPM | HEIGHT: 72 IN | SYSTOLIC BLOOD PRESSURE: 145 MMHG | BODY MASS INDEX: 35.89 KG/M2 | WEIGHT: 265 LBS | DIASTOLIC BLOOD PRESSURE: 95 MMHG | OXYGEN SATURATION: 98 % | TEMPERATURE: 98.3 F | HEART RATE: 95 BPM

## 2023-03-10 DIAGNOSIS — N20.0 CALCULUS OF KIDNEY: ICD-10-CM

## 2023-03-10 PROCEDURE — 99204 OFFICE O/P NEW MOD 45 MIN: CPT

## 2023-03-10 RX ORDER — TAMSULOSIN HYDROCHLORIDE 0.4 MG/1
0.4 CAPSULE ORAL
Qty: 30 | Refills: 0 | Status: ACTIVE | COMMUNITY
Start: 2023-03-10 | End: 1900-01-01

## 2023-03-12 LAB — BACTERIA UR CULT: NORMAL

## 2023-03-13 NOTE — HISTORY OF PRESENT ILLNESS
[FreeTextEntry1] : Language: English\par Date of First visit: 03/10/2023 \par Accompanied by: self\par Contact info: \par Referring Provider/PCP: Dr. Keith Montenegro\par Fax: [phone]\par \par \par \par CC/ Problem List:\par Left ?UPJO and nephrolithiasis\par Patient requested second opinion, sees Dr. [facility]\par \par ===============================================================================\par FIRST VISIT / Summary:\par Very pleasant 22 yo M who was treated earlier this year with a left pyeloplasty 1/12/23 and has residual stones. Still has stent in place from that procedure. He had plan for second procedure on 3/17 to remove the stones. He has significant stent symptoms, taking tylenol and ibuprofen. Has not been on tamsulosin. He has been on k citrate for potential dissolution (HU about 500 on CT)\par \par -------------------------------------------------------------------------------------------\par INTERVAL VISITS:\par \par ===============================================================================\par \par PMH: none\par Meds: k citrate, ibuprofen/tylenol, tamsulosin\par All: nkda\par FHx: No  malignancies \par SocHx: \par \par PSH: Left dismembered pyeloplasty 2023\par \par \par ROS: Review of Systems is as per HPI unless otherwise denoted below\par \par \par ===============================================================================\par DATA: \par \par LABS (SELECTED):---------------------------------------------------------------------------------------------------\par \par \par RADS:-------------------------------------------------------------------------------------------------------------------\par CT scans from January and February: Show reduction in stone volume, given no stone removed during the procedure the jesse dill appears to be working\par \par PATHOLOGY/CYTOLOGY:-------------------------------------------------------------------------------------------\par \par \par VOIDING STUDIES: ----------------------------------------------------------------------------------------------------\par \par \par STONE STUDIES: (Analysis/LLSA)----------------------------------------------------------------------------------\par \par \par PROCEDURES: -----------------------------------------------------------------------------------------------\par \par \par \par \par ===============================================================================\par \par PHYSICAL EXAM:\par \par GEN: AAOx3, NAD\par \par PSYCH: Appropriate Behavior, Affect Congruent\par \par Lungs: No labored breathing\par \par GAIT: Gait normal, Stability good\par \par ABD: no suprapubic or CVAT\par \par \par  FOCUSED: --------------(done xx/xx/xxxx)------------------------------------------------------------------------\par \par \par =======================================================================================\par DISCUSSION: \par =======================================================================================\par ASSESSMENT and PLAN\par \par \par 1. Nephrolithiasis\par - CT scans from January and February: Show reduction in stone volume, given no stone removed during the procedure the k citrat appears to be working (though minimal change to upper pole stones)\par - he is planned for URS and stent removal next friday with Dr. Shine\par \par 2. s/p pyeloplasty\par - minimal hydronephrosis originally, however op note reports narrowed segment consistent with UPJO\par - I explained I cannot perform any procedure sooner than this\par - tamsulosin sent for stent symptoms\par \par =======================================================================================\par \par Thank you for allowing me to assist in the care of your patient. Should you have any questions please do not hesitate to reach out to me.\par \par \par Sage Olguin MD\par [facility]\par [facility] [place]\par [address], [address]\[place] [address]\par T: [phone]\par F: [phone]

## 2023-04-06 RX ORDER — TAMSULOSIN HYDROCHLORIDE 0.4 MG/1
0.4 CAPSULE ORAL
Qty: 30 | Refills: 0 | Status: ACTIVE | COMMUNITY
Start: 2023-04-06 | End: 1900-01-01

## 2023-05-01 ENCOUNTER — RX RENEWAL (OUTPATIENT)
Age: 23
End: 2023-05-01

## (undated) DEVICE — POSITIONER PATIENT SAFETY STRAP 3X60"

## (undated) DEVICE — XI TIP COVER

## (undated) DEVICE — SYR LUER LOK 10CC

## (undated) DEVICE — XI DRAPE ARM

## (undated) DEVICE — XI ARM PERMANENT CAUTERY SPATULA

## (undated) DEVICE — PACK CYSTO

## (undated) DEVICE — XI VESSEL SEALER

## (undated) DEVICE — XI ARM FORCEP FENESTRATED BIPOLAR 8MM

## (undated) DEVICE — D HELP - CLEARVIEW CLEARIFY SYSTEM

## (undated) DEVICE — XI SEAL UNIV 5- 8 MM

## (undated) DEVICE — ELCTR GROUNDING PAD INFANT COVIDIEN

## (undated) DEVICE — XI DRAPE COLUMN

## (undated) DEVICE — SUT VICRYL PLUS 4-0 27" RB-1 UNDYED

## (undated) DEVICE — GOWN XL

## (undated) DEVICE — SUT MONOCRYL 5-0 18" P-1 UNDYED

## (undated) DEVICE — PD GROUND ELECSURG NEO 1-6LB

## (undated) DEVICE — POSITIONER PINK PAD PIGAZZI SYSTEM

## (undated) DEVICE — POSITIONER FOAM HEAD CRADLE (PINK)

## (undated) DEVICE — TUBING STRYKEFLOW II SUCTION / IRRIGATOR

## (undated) DEVICE — TUBING AIRSEAL TRI-LUMEN FILTERED

## (undated) DEVICE — BASIN SET SINGLE

## (undated) DEVICE — TIP METZENBAUM SCISSOR MONOPOLAR ENDOCUT (ORANGE)

## (undated) DEVICE — TROCAR SURGIQUEST AIRSEAL 8MMX100MM

## (undated) DEVICE — PACK ROBOTIC

## (undated) DEVICE — XI ARM CLIP APPLIER LARGE

## (undated) DEVICE — POSITIONER PURPLE ARM ONE STEP (LARGE)

## (undated) DEVICE — CATH IV SAFE INSYTE 14G X 1.75" (ORANGE)

## (undated) DEVICE — TUBING TUR 2 PRONG

## (undated) DEVICE — XI ARM FORCE BIPOLAR 8MM

## (undated) DEVICE — POSITIONER MATTRESS EGGCRATE

## (undated) DEVICE — XI ARM PERMANENT CAUTERY HOOK

## (undated) DEVICE — XI ARM CLIP APPLIER MEDIUM-LARGE

## (undated) DEVICE — ELCTR BOVIE TIP BLADE VALLEYLAB 6.5"

## (undated) DEVICE — TROCAR SURGIQUEST AIRSEAL 5MM X 100MM

## (undated) DEVICE — PACK ROBOTIC LIJ

## (undated) DEVICE — Device

## (undated) DEVICE — XI ARM SCISSOR MONO CURVED

## (undated) DEVICE — XI ARM DISSECTOR CURVED BIPOLAR 8MM

## (undated) DEVICE — DRSG STERISTRIPS 0.5 X 4"

## (undated) DEVICE — XI ARM GRASPER TIP UP FENESTRATED

## (undated) DEVICE — XI ARM FORCEP TENACULUM

## (undated) DEVICE — TUBING OLYMPUS INSUFFLATION

## (undated) DEVICE — GOWN XXXL

## (undated) DEVICE — XI ARM FORCEP MARYLAND BIPOLAR

## (undated) DEVICE — SUT VICRYL 2-0 27" UR-6

## (undated) DEVICE — BAG URINE W METER 2L

## (undated) DEVICE — XI ARM NEEDLE DRIVER SUTURECUT LRG 8MM

## (undated) DEVICE — DRSG ALLEVYN BORDER LITE 2X2"

## (undated) DEVICE — XI NEEDLE DRIVER LARGE

## (undated) DEVICE — SUT VICRYL 3-0 27" RB-1 UNDYED

## (undated) DEVICE — ELCTR GROUNDING PAD ADULT COVIDIEN

## (undated) DEVICE — XI OBTURATOR OPTICAL BLADELESS 8MM

## (undated) DEVICE — XI ARM FORCEP PROGRASP 8MM